# Patient Record
Sex: FEMALE | Race: WHITE | NOT HISPANIC OR LATINO | URBAN - METROPOLITAN AREA
[De-identification: names, ages, dates, MRNs, and addresses within clinical notes are randomized per-mention and may not be internally consistent; named-entity substitution may affect disease eponyms.]

---

## 2017-03-01 ENCOUNTER — OFFICE VISIT (OUTPATIENT)
Dept: URGENT CARE | Facility: URGENT CARE | Age: 27
End: 2017-03-01
Payer: COMMERCIAL

## 2017-03-01 VITALS
TEMPERATURE: 98 F | BODY MASS INDEX: 21.16 KG/M2 | RESPIRATION RATE: 16 BRPM | HEART RATE: 88 BPM | HEIGHT: 62 IN | WEIGHT: 115 LBS | OXYGEN SATURATION: 99 % | DIASTOLIC BLOOD PRESSURE: 60 MMHG | SYSTOLIC BLOOD PRESSURE: 100 MMHG

## 2017-03-01 DIAGNOSIS — S90.414A: Primary | ICD-10-CM

## 2017-03-01 DIAGNOSIS — L08.9: Primary | ICD-10-CM

## 2017-03-01 PROCEDURE — 99202 OFFICE O/P NEW SF 15 MIN: CPT | Performed by: FAMILY MEDICINE

## 2017-03-01 RX ORDER — ETONOGESTREL AND ETHINYL ESTRADIOL VAGINAL RING .015; .12 MG/D; MG/D
1 RING VAGINAL
COMMUNITY

## 2017-03-01 NOTE — MR AVS SNAPSHOT
"              After Visit Summary   3/1/2017    Nelsy Weiner    MRN: [id number]           Patient Information     Date Of Birth          1990        Visit Information        Provider Department      3/1/2017 8:45 PM Ame Baron MD [facility]        Today's Diagnoses     Infected abrasion of toe, right, initial encounter    -  1       Follow-ups after your visit        Who to contact     If you have questions or need follow up information about today's clinic visit or your schedule please contact [facility] directly at [phone].  Normal or non-critical lab and imaging results will be communicated to you by [employer], letter or phone within 4 business days after the clinic has received the results. If you do not hear from us within 7 days, please contact the clinic through [employer] or phone. If you have a critical or abnormal lab result, we will notify you by phone as soon as possible.  Submit refill requests through [facility] or call your pharmacy and they will forward the refill request to us. Please allow 3 business days for your refill to be completed.          Additional Information About Your Visit        MyChart Information     [facility] lets you send messages to your doctor, view your test results, renew your prescriptions, schedule appointments and more. To sign up, go to [email or web address] . Click on \"Log in\" on the left side of the screen, which will take you to the Welcome page. Then click on \"Sign up Now\" on the right side of the page.     You will be asked to enter the access code listed below, as well as some personal information. Please follow the directions to create your username and password.     Your access code is: [email or web address]  Expires: 2017  8:59 PM     Your access code will  in 90 days. If you need help or a new code, please call your [place] clinic or [phone].        Care EveryWhere ID     This is your Care " "EveryWhere ID. This could be used by other organizations to access your [place] medical records  [id number]        Your Vitals Were     Pulse Temperature Respirations Height Last Period Pulse Oximetry    88 98  F (36.7  C) (Oral) 16 5' 2\" (1.575 m) 02/17/2017 99%    Breastfeeding? BMI (Body Mass Index)                No 21.03 kg/m2           Blood Pressure from Last 3 Encounters:   03/01/17 100/60    Weight from Last 3 Encounters:   03/01/17 115 lb (52.2 kg)              Today, you had the following     No orders found for display         Today's Medication Changes          These changes are accurate as of: 3/1/17  8:59 PM.  If you have any questions, ask your nurse or doctor.               Start taking these medicines.        Dose/Directions    amoxicillin-clavulanate 875-125 MG per tablet   Commonly known as:  AUGMENTIN   Used for:  Infected abrasion of toe, right, initial encounter   Started by:  Ame Baron MD        Dose:  1 tablet   Take 1 tablet by mouth 2 times daily   Quantity:  20 tablet   Refills:  0            Where to get your medicines      These medications were sent to [id number] - [place], [place] [facility] [address] [facility]  [address], [place] [address]    Hours:  24-hours Phone:  [phone]     amoxicillin-clavulanate 875-125 MG per tablet                Primary Care Provider    None Specified       No primary provider on file.        Thank you!     Thank you for choosing [facility]  for your care. Our goal is always to provide you with excellent care. Hearing back from our patients is one way we can continue to improve our services. Please take a few minutes to complete the written survey that you may receive in the mail after your visit with us. Thank you!             Your Updated Medication List - Protect others around you: Learn how to safely use, store and throw away your medicines at " www.disposemymeds.org.          This list is accurate as of: 3/1/17  8:59 PM.  Always use your most recent med list.                   Brand Name Dispense Instructions for use    amoxicillin-clavulanate 875-125 MG per tablet    AUGMENTIN    20 tablet    Take 1 tablet by mouth 2 times daily       etonogestrel-ethinyl estradiol 0.12-0.015 MG/24HR vaginal ring    NUVARING     Place 1 each vaginally every 28 days

## 2017-03-02 NOTE — NURSING NOTE
"Chief Complaint   Patient presents with     Urgent Care     Musculoskeletal Problem     walking on beach and sliced right great toe on a shell in [facility]. Last Tdap       Initial /60  Pulse 88  Temp 98  F (36.7  C) (Oral)  Resp 16  Ht 5' 2\" (1.575 m)  Wt 115 lb (52.2 kg)  LMP 02/17/2017  SpO2 99%  Breastfeeding? No  BMI 21.03 kg/m2 Estimated body mass index is 21.03 kg/(m^2) as calculated from the following:    Height as of this encounter: 5' 2\" (1.575 m).    Weight as of this encounter: 115 lb (52.2 kg).  Medication Reconciliation: complete  "

## 2017-03-02 NOTE — PROGRESS NOTES
"SUBJECTIVE:     Chief Complaint   Patient presents with     Urgent Care     Musculoskeletal Problem     walking on beach and sliced right great toe on a shell in [facility]. Last Tdap     Nelsy Weiner is a 26 year old female who presents to the clinic with a laceration on the right  Great toe when walking on a beach in [place] sustained 1 week(s) ago.  This is a non-work related and accidental injury.    Mechanism of injury: cut on a shell.    Associated symptoms: Denies loss of consciousness, vomiting or confusion.  Denies numbness, weakness, or loss of function  Last tetanus booster within 10 years: yes    No past medical history on file.    ALLERGIES:  Review of patient's allergies indicates no known allergies.      No current outpatient prescriptions on file prior to visit.  No current facility-administered medications on file prior to visit.     Social History   Substance Use Topics     Smoking status: Never Smoker     Smokeless tobacco: Not on file     Alcohol use Not on file       No family history on file.      ROS:  EYES: NEGATIVE for vision changes or irritation  ENT/MOUTH: NEGATIVE for ear, mouth and throat problems  RESP:NEGATIVE for significant cough or SOB  GI: NEGATIVE for nausea, abdominal pain, heartburn, or change in bowel habits    EXAM:      VITALS: /60  Pulse 88  Temp 98  F (36.7  C) (Oral)  Resp 16  Ht 5' 2\" (1.575 m)  Wt 115 lb (52.2 kg)  LMP 02/17/2017  SpO2 99%  Breastfeeding? No  BMI 21.03 kg/m2  Site of wound:  right   Great toe,  Near toenail margin  Size of laceration: 1 centimeters  Depth of laceration 5 millimeters  Characteristics of the laceration: longitudinal and straight  Tendon function intact: yes  Sensation to light touch intact: yes  Pulses intact: yes  Purulent drainage, with granulation tissue at the site of the laceration  Erythema with tenderness to slightly proximal of the toenail   GENERAL APPEARANCE: healthy, alert and no distress  EYES: EOMI,  PERRL, " conjunctiva clear  NECK: supple, non-tender to palpation, no adenopathy noted  RESP: lungs clear to auscultation - no rales, rhonchi or wheezes  CV: regular rates and rhythm, normal S1 S2, no murmur noted    Assessment:  Infected abrasion of toe, right, initial encounter     - amoxicillin-clavulanate (AUGMENTIN) 875-125 MG per tablet; Take 1 tablet by mouth 2 times daily     wound left open  Perform warm water soaks 2-3 x per day    After care instructions:     Signs of infection discussed today  Clean area of infection 2x per day with peroxide